# Patient Record
Sex: MALE | Race: BLACK OR AFRICAN AMERICAN
[De-identification: names, ages, dates, MRNs, and addresses within clinical notes are randomized per-mention and may not be internally consistent; named-entity substitution may affect disease eponyms.]

---

## 2017-08-08 ENCOUNTER — HOSPITAL ENCOUNTER (EMERGENCY)
Dept: HOSPITAL 17 - NED | Age: 22
Discharge: LEFT BEFORE DISCHARGE | End: 2017-08-08
Payer: SELF-PAY

## 2017-08-08 VITALS
OXYGEN SATURATION: 100 % | HEART RATE: 74 BPM | RESPIRATION RATE: 18 BRPM | DIASTOLIC BLOOD PRESSURE: 80 MMHG | TEMPERATURE: 97.9 F | SYSTOLIC BLOOD PRESSURE: 145 MMHG

## 2017-08-08 VITALS — HEIGHT: 67 IN | BODY MASS INDEX: 25.95 KG/M2 | WEIGHT: 165.35 LBS

## 2017-08-08 DIAGNOSIS — R07.89: ICD-10-CM

## 2017-08-08 DIAGNOSIS — R06.02: Primary | ICD-10-CM

## 2017-08-08 DIAGNOSIS — Z53.21: ICD-10-CM

## 2017-08-08 PROCEDURE — 99281 EMR DPT VST MAYX REQ PHY/QHP: CPT

## 2017-08-08 NOTE — PD
Physical Exam


Time Seen by Provider:  14:33


Narrative


23 y/o male with sob, chest tightness.








Vital signs reviewed.


Seen at triage desk.  Awaiting bed placement.





Data


Data


Last Documented VS





Vital Signs








  Date Time  Temp Pulse Resp B/P Pulse Ox O2 Delivery O2 Flow Rate FiO2


 


8/8/17 14:05 97.9 74 18 145/80 100   











MDM


Medical Record Reviewed:  Yes


Supervised Visit with BLANE:  No








Be Vigil Aug 8, 2017 14:33

## 2017-08-11 ENCOUNTER — HOSPITAL ENCOUNTER (EMERGENCY)
Dept: HOSPITAL 17 - NED | Age: 22
Discharge: LEFT BEFORE DISCHARGE | End: 2017-08-11
Payer: SELF-PAY

## 2017-08-11 VITALS
OXYGEN SATURATION: 99 % | DIASTOLIC BLOOD PRESSURE: 92 MMHG | RESPIRATION RATE: 14 BRPM | TEMPERATURE: 98.6 F | HEART RATE: 97 BPM | SYSTOLIC BLOOD PRESSURE: 135 MMHG

## 2017-08-11 VITALS — HEIGHT: 68 IN | BODY MASS INDEX: 26.06 KG/M2 | WEIGHT: 171.96 LBS

## 2017-08-11 DIAGNOSIS — Z53.21: ICD-10-CM

## 2017-08-11 DIAGNOSIS — R21: ICD-10-CM

## 2017-08-11 DIAGNOSIS — M79.89: Primary | ICD-10-CM

## 2017-08-11 DIAGNOSIS — R06.02: ICD-10-CM

## 2017-08-11 PROCEDURE — 99281 EMR DPT VST MAYX REQ PHY/QHP: CPT

## 2017-08-11 NOTE — PD
Physical Exam


Date Seen by Provider:  Aug 11, 2017


Time Seen by Provider:  19:33





Data


Data


Last Documented VS





Vital Signs








  Date Time  Temp Pulse Resp B/P Pulse Ox O2 Delivery O2 Flow Rate FiO2


 


8/11/17 19:27 98.6 97 14 135/92 99 Room Air  











[facility]


Supervised Visit with BLANE:  No


Narrative Course


23 YO M with complaint of "feeling weird" after smoking a joint 3 days ago.


States that his hands feel tight and swollen.


States that he feels fluid in his chest.


States that his ears are hot and he feels SOB.


States "my eyes are going out on me."


States that he has a rash on his back and chest.


Denies drug or ETOH use today.





Vitals reviewed.


Patient seen in triage, awaiting bed placement.








Samantha Carlin Aug 11, 2017 19:36

## 2018-01-17 ENCOUNTER — HOSPITAL ENCOUNTER (EMERGENCY)
Dept: HOSPITAL 17 - NEDAMB | Age: 23
Discharge: LEFT BEFORE DISCHARGE | End: 2018-01-17
Payer: COMMERCIAL

## 2018-01-17 VITALS
OXYGEN SATURATION: 98 % | SYSTOLIC BLOOD PRESSURE: 138 MMHG | HEART RATE: 76 BPM | DIASTOLIC BLOOD PRESSURE: 80 MMHG | RESPIRATION RATE: 14 BRPM | TEMPERATURE: 98.1 F

## 2018-01-17 VITALS — BODY MASS INDEX: 26.72 KG/M2 | HEIGHT: 69 IN | WEIGHT: 180.38 LBS

## 2018-01-17 DIAGNOSIS — R51: Primary | ICD-10-CM

## 2018-01-17 DIAGNOSIS — Z53.21: ICD-10-CM

## 2018-01-17 PROCEDURE — 99281 EMR DPT VST MAYX REQ PHY/QHP: CPT

## 2018-01-17 NOTE — PD
Physical Exam


Date Seen by Provider:  Jan 17, 2018


Time Seen by Provider:  16:49


Narrative


22 year old male presents tot he emergency department for evaluation of 

intermittent headaches over the past week.  Current headache started 

approximately one hour ago.  He took ibuprofen last week, but hasn't taken 

anything over the counter since.  Current pain is 8/10.





Data


Data


Last Documented VS





Vital Signs








  Date Time  Temp Pulse Resp B/P (MAP) Pulse Ox O2 Delivery O2 Flow Rate FiO2


 


1/17/18 15:11 98.1 76 14 138/80 (99) 98   











MDM


Supervised Visit with BLANE:  No


Narrative Course


22 year old male presents to the emergency department for evaluation of 

intermittent headaches over the past week.  Patient is initially seen in 

triage.  Patient left AMA before he could be placed in a medical bed.


Diagnosis





 Primary Impression:  


 Left against medical advice


 Additional Impression:  


 Cephalgia


 Qualified Codes:  R51 - Headache


Disposition:  07 AGAINST MEDICAL ADVICE











Huma Flores Jan 17, 2018 20:13

## 2018-03-06 ENCOUNTER — HOSPITAL ENCOUNTER (EMERGENCY)
Dept: HOSPITAL 17 - NED | Age: 23
Discharge: LEFT BEFORE DISCHARGE | End: 2018-03-06
Payer: COMMERCIAL

## 2018-03-06 DIAGNOSIS — Z53.21: Primary | ICD-10-CM

## 2018-03-06 PROCEDURE — 99281 EMR DPT VST MAYX REQ PHY/QHP: CPT
